# Patient Record
Sex: MALE | Race: WHITE | NOT HISPANIC OR LATINO | URBAN - METROPOLITAN AREA
[De-identification: names, ages, dates, MRNs, and addresses within clinical notes are randomized per-mention and may not be internally consistent; named-entity substitution may affect disease eponyms.]

---

## 2017-03-05 ENCOUNTER — EMERGENCY (EMERGENCY)
Facility: HOSPITAL | Age: 57
LOS: 1 days | Discharge: HOME | End: 2017-03-05
Attending: EMERGENCY MEDICINE | Admitting: EMERGENCY MEDICINE
Payer: OTHER MISCELLANEOUS

## 2017-03-05 VITALS
OXYGEN SATURATION: 99 % | TEMPERATURE: 99 F | HEART RATE: 94 BPM | SYSTOLIC BLOOD PRESSURE: 154 MMHG | WEIGHT: 229.94 LBS | RESPIRATION RATE: 16 BRPM | DIASTOLIC BLOOD PRESSURE: 86 MMHG

## 2017-03-05 DIAGNOSIS — J34.89 OTHER SPECIFIED DISORDERS OF NOSE AND NASAL SINUSES: ICD-10-CM

## 2017-03-05 DIAGNOSIS — S02.2XXA FRACTURE OF NASAL BONES, INITIAL ENCOUNTER FOR CLOSED FRACTURE: ICD-10-CM

## 2017-03-05 PROCEDURE — 99284 EMERGENCY DEPT VISIT MOD MDM: CPT

## 2017-03-05 PROCEDURE — 70160 X-RAY EXAM OF NASAL BONES: CPT | Mod: 26

## 2017-03-05 RX ORDER — TETANUS TOXOID, REDUCED DIPHTHERIA TOXOID AND ACELLULAR PERTUSSIS VACCINE, ADSORBED 5; 2.5; 8; 8; 2.5 [IU]/.5ML; [IU]/.5ML; UG/.5ML; UG/.5ML; UG/.5ML
0.5 SUSPENSION INTRAMUSCULAR ONCE
Qty: 0 | Refills: 0 | Status: COMPLETED | OUTPATIENT
Start: 2017-03-05 | End: 2017-03-05

## 2017-03-05 RX ADMIN — TETANUS TOXOID, REDUCED DIPHTHERIA TOXOID AND ACELLULAR PERTUSSIS VACCINE, ADSORBED 0.5 MILLILITER(S): 5; 2.5; 8; 8; 2.5 SUSPENSION INTRAMUSCULAR at 16:09

## 2017-03-05 NOTE — ED PROVIDER NOTE - OBJECTIVE STATEMENT
57 yo male s/p trip and fall while walking striking his face against his luggage he was rolling. no loc no head or neck pain. _ nose pain. w bleeding that has stopped.

## 2017-03-05 NOTE — ED PROVIDER NOTE - MEDICAL DECISION MAKING DETAILS
uncomplicated ed course.   At the time of discharge from the Emergency Department, the patient is alert with fluent appropriate speech and ambulatory without difficulty. A complete medical screening examination was performed and no emergency medical condition was identified.

## 2017-03-05 NOTE — ED ADULT NURSE NOTE - CHIEF COMPLAINT QUOTE
BIBA from [facility] s/p mechanical fall, pt hit face onto his rolling suitcase, with tenderness and swelling to nasal bridge, + dried blood to b/l nares. Pt also c/o HA & right wrist pain. Denies LOC no blood thinners.

## 2017-03-05 NOTE — ED PROVIDER NOTE - ENMT, MLM
Airway patent, Nasal mucosa clear. Mouth with normal mucosa. Throat has no vesicles, no oropharyngeal exudates and uvula is midline.  no septal hematoma no dental injury no blood in his oropharynx

## 2017-03-05 NOTE — ED PROVIDER NOTE - MUSCULOSKELETAL, MLM
Spine appears normal, range of motion is not limited, no muscle or joint tenderness. abrasion to right arm

## 2017-03-05 NOTE — ED ADULT TRIAGE NOTE - CHIEF COMPLAINT QUOTE
BIBA from [facility] s/p mechanical fall, pt hit face onto his rolling suitcase, with tenderness and swelling to nasal bridge, + dried blood to b/l nares. Pt also c/o HA & right wrist pain. Denies LOC no blood thinners.

## 2017-08-03 RX ORDER — OMEPRAZOLE 10 MG/1
0 CAPSULE, DELAYED RELEASE ORAL
Qty: 0 | Refills: 0 | COMMUNITY

## 2018-10-26 PROBLEM — K21.9 GASTRO-ESOPHAGEAL REFLUX DISEASE WITHOUT ESOPHAGITIS: Chronic | Status: ACTIVE | Noted: 2017-03-05

## 2018-10-26 PROBLEM — Z00.00 ENCOUNTER FOR PREVENTIVE HEALTH EXAMINATION: Status: ACTIVE | Noted: 2018-10-26

## 2018-10-29 ENCOUNTER — APPOINTMENT (OUTPATIENT)
Dept: INTERNAL MEDICINE | Facility: CLINIC | Age: 58
End: 2018-10-29

## 2018-10-29 VITALS
WEIGHT: 220 LBS | SYSTOLIC BLOOD PRESSURE: 151 MMHG | HEIGHT: 72 IN | HEART RATE: 82 BPM | DIASTOLIC BLOOD PRESSURE: 101 MMHG | TEMPERATURE: 98.5 F | OXYGEN SATURATION: 96 % | BODY MASS INDEX: 29.8 KG/M2

## 2018-10-29 DIAGNOSIS — Z80.1 FAMILY HISTORY OF MALIGNANT NEOPLASM OF TRACHEA, BRONCHUS AND LUNG: ICD-10-CM

## 2018-10-29 DIAGNOSIS — D86.9 SARCOIDOSIS, UNSPECIFIED: ICD-10-CM

## 2018-10-29 DIAGNOSIS — I10 ESSENTIAL (PRIMARY) HYPERTENSION: ICD-10-CM

## 2018-10-29 RX ORDER — CYCLOSPORINE 0.5 MG/ML
0.05 EMULSION OPHTHALMIC
Qty: 6 | Refills: 0 | Status: ACTIVE | COMMUNITY
Start: 2018-09-10

## 2018-10-29 RX ORDER — OMEPRAZOLE 40 MG/1
40 CAPSULE, DELAYED RELEASE ORAL
Qty: 30 | Refills: 0 | Status: ACTIVE | COMMUNITY
Start: 2018-06-25

## 2018-10-29 RX ORDER — TRAZODONE HYDROCHLORIDE 150 MG/1
150 TABLET ORAL
Qty: 30 | Refills: 0 | Status: ACTIVE | COMMUNITY
Start: 2018-06-03

## 2018-10-29 RX ORDER — RISEDRONATE SODIUM 35 MG/1
35 TABLET, FILM COATED ORAL
Qty: 12 | Refills: 0 | Status: ACTIVE | COMMUNITY
Start: 2018-05-13

## 2018-10-29 RX ORDER — HYDROCORTISONE VALERATE 2 MG/G
0.2 CREAM TOPICAL
Qty: 45 | Refills: 0 | Status: ACTIVE | COMMUNITY
Start: 2018-07-16

## 2018-10-29 NOTE — HISTORY OF PRESENT ILLNESS
[FreeTextEntry1] : Followup possible and abnormal chest x-ray. [de-identified] : Patient is a 57-year-old male with a history of carotid artery aneurysm status post stent about a year ago. At that time he was found to have an abnormal EKG and an abnormal PET scan. He was felt to have cardiac sarcoid and was started on high-dose steroids for 3 months. He then was placed on CellCept. He is followed by a physician at [facility]. Patient recently had a preop chest x-ray in preparation for a carotid angiogram. This is reported as showing a possible nodule in the right mid lung field and I am asked to evaluate. He denies shortness of breath cough or wheezing. Patient's prior chest x-ray in April was entirely normal.

## 2018-10-29 NOTE — ASSESSMENT
[FreeTextEntry1] : I have reviewed the chest x-ray dated 1010 2018. I see no evidence of obvious nodule. However in view of the chest chest x-ray report with a questionable right upper lobe nodule will recommend a CT of his chest to confirm that this is not a new nodule. The chest x-ray from April was also reviewed is entirely normal.

## 2019-09-14 DIAGNOSIS — G43.909 MIGRAINE, UNSPECIFIED, NOT INTRACTABLE, W/OUT STATUS MIGRAINOSUS: ICD-10-CM

## 2019-09-14 DIAGNOSIS — I77.9 DISORDER OF ARTERIES AND ARTERIOLES, UNSPECIFIED: ICD-10-CM

## 2019-09-14 DIAGNOSIS — Z86.59 PERSONAL HISTORY OF OTHER MENTAL AND BEHAVIORAL DISORDERS: ICD-10-CM

## 2019-09-14 DIAGNOSIS — Z87.442 PERSONAL HISTORY OF URINARY CALCULI: ICD-10-CM

## 2019-10-01 ENCOUNTER — APPOINTMENT (OUTPATIENT)
Dept: GASTROENTEROLOGY | Facility: CLINIC | Age: 59
End: 2019-10-01
Payer: COMMERCIAL

## 2019-10-01 VITALS
BODY MASS INDEX: 26.41 KG/M2 | HEART RATE: 80 BPM | DIASTOLIC BLOOD PRESSURE: 86 MMHG | WEIGHT: 195 LBS | HEIGHT: 72 IN | SYSTOLIC BLOOD PRESSURE: 120 MMHG

## 2019-10-01 DIAGNOSIS — M51.9 UNSPECIFIED THORACIC, THORACOLUMBAR AND LUMBOSACRAL INTERVERTEBRAL DISC DISORDER: ICD-10-CM

## 2019-10-01 DIAGNOSIS — L80 VITILIGO: ICD-10-CM

## 2019-10-01 PROCEDURE — 99244 OFF/OP CNSLTJ NEW/EST MOD 40: CPT

## 2019-10-01 RX ORDER — ATORVASTATIN CALCIUM 40 MG/1
40 TABLET, FILM COATED ORAL
Qty: 90 | Refills: 0 | Status: ACTIVE | COMMUNITY
Start: 2019-06-04

## 2019-10-01 RX ORDER — MYCOPHENOLATE MOFETIL 250 MG/1
250 CAPSULE ORAL
Qty: 180 | Refills: 0 | Status: ACTIVE | COMMUNITY
Start: 2019-09-17

## 2019-10-01 RX ORDER — ASPIRIN 325 MG/1
325 TABLET ORAL
Qty: 30 | Refills: 0 | Status: ACTIVE | COMMUNITY
Start: 2018-11-07

## 2019-10-01 NOTE — ASSESSMENT
[FreeTextEntry1] : \par \par 1. GERD:  well  -  controlled,  no ht burn,  dysphagia,  throat clear\par * No LPR,  No Parada’s w No Dysplasia,  Esophagitis grade: A-\par * Anti-reflux diet & life-style changes emphasized.  No  Bedge\par * Weight reduction & regular exercise emphasized\par * ++ PPI: Omep 40mg qd  ,  No  H2B q HS:  ,  No Carafate  1 gram:\par * F/U  EGD:  [     1 ] mo.  /  [   2020   ] yr---Barretts screen \par * No  pH Monitor,  No  Manometry,  No  Esophagram\par * No  ENT  eval/F/U,  No  Surgical  eval\par \par \par \par \par \par \par 2. Gastritis: well – controlled,  No N, V, early satiety, pain\par * No H.Pylori, No IM, No Bile, No NSAIDs\par * Avoid NSAIDs / ETOH\par * No Prevpac, No Pylera, \par * No PPI:  x 0 days,  No Carafate 1 gm QID\par * No F/U EGD  or  No UBT \par \par \par \par \par \par \par 3. Hemorrhoids:  well – controlled.  No pain,  swelling,  itch,  bleeding\par * Discussed the potential complications of thrombosis,  pain,  infection,  swelling, itching,  bleeding \par * High – Fiber Diet was reviewed and emphasized\par * 6  --  8 cups of decaffeinated fluid daily\par * Sitz Bathes BID,  No:  Anusol HC  Suppos / Cream  [place] BID\par * No:  Tucks BID,   No:  Balneol Lotion,   No:  Calmoseptine Oint,   ++ Prep H prn\par * No:  Colorectal surgical evaluation for possible ablation \par \par \par \par \par \par \par 4. Colorectal  & Gastric Neoplasia Screening:  to be evaluated\par * Discussed the pre-malignant potential of polyps\par * Discussed the importance of f/u surveillance / screening\par * High-Fiber Diet was reviewed and emphasized\par * Anti-oxidants and ASA/NSAID Therapy reviewed\par * Given age > 45-51 yo &+PH Polyps \par * Recommend Colonoscopy--1/2020,  EGD--1/2020\par * R/O  Colonic Neoplasia\par \par \par \par \par \par \par Informed Consent:\par * The risks & Benefits of EGD  /  Colonoscopy were discussed w patient.\par * This included but was not limited to perforation, bleeding, sedation /med rxns possibly requiring surgery, blood transfusions, antibiotics & CPR/Intubation.\par * Pt. understands & agrees to the procedures.\par * Pt. advised to D/C  ASA/NSAIDs  7  Days  \par * [ +++ ]  Dulcolax / Miralax / Mag. Citrate ,  [     ] Prepopik/ Clenpiq ,  [     ] Osmo Prep,  [    ] GoLytely,  prep. reviewed w Pt.\par * Hold  [           ] AM of procedure.\par * Hold  [           ] PM of procedure.\par * Take  [           ] PM of procedure.\par * Take  [           ] AM of procedure.\par \par

## 2019-10-01 NOTE — HISTORY OF PRESENT ILLNESS
[de-identified] : \par PCP: Mary Jane\par \par 57yo M w h/o Cardiac sarcoid followed by cardiology at [facility]--on cellcept, dx after preop EKG showed a RBBB/LAFB--no need for PPM, HTN, HLD, Carotid dis--s/p stent x 5 for aneurysm , Migraine H/A, Anxiety, Lumbar disc dis , Vitiligo \par Hemorrhoids\par GERD, Gastric Polyps, Gastritis\par \par Today:  feeling well, no sims, cp, sob \par \par * Abd pain—no\par * Nausea—no\par * Vomit—no\par * Belching—no\par * Regurgitation—no\par * Ht burn—occas \par * Throat Clearing—no\par * Globus—no\par * Cough—no\par * Hoarseness—no\par * Dysphagia—no\par * BMs: # 4 qd\par * Constipation—no\par * Diarrhea—no\par * Bloating—no\par * Flatulence—no\par * Gurgling—no\par * Melena—no\par * BPBPR—no\par * Anorexia—no\par * Wt. Loss-no\par \par

## 2020-01-05 ENCOUNTER — RESULT REVIEW (OUTPATIENT)
Age: 60
End: 2020-01-05

## 2020-01-06 ENCOUNTER — APPOINTMENT (OUTPATIENT)
Dept: GASTROENTEROLOGY | Facility: HOSPITAL | Age: 60
End: 2020-01-06

## 2020-01-16 ENCOUNTER — APPOINTMENT (OUTPATIENT)
Dept: GASTROENTEROLOGY | Facility: CLINIC | Age: 60
End: 2020-01-16
Payer: COMMERCIAL

## 2020-01-16 VITALS
WEIGHT: 195 LBS | DIASTOLIC BLOOD PRESSURE: 80 MMHG | BODY MASS INDEX: 26.41 KG/M2 | SYSTOLIC BLOOD PRESSURE: 126 MMHG | HEART RATE: 80 BPM | HEIGHT: 72 IN

## 2020-01-16 PROCEDURE — 99214 OFFICE O/P EST MOD 30 MIN: CPT

## 2020-01-16 NOTE — ASSESSMENT
[FreeTextEntry1] : \par \par 1. GERD:  well  -  controlled,  occas ht burn,  dysphagia,  throat clear\par * No LPR,  No Parada’s w No Dysplasia,  Esophagitis grade: A--\par * Anti-reflux diet & life-style changes emphasized.  No  Bedge\par * Weight reduction & regular exercise emphasized\par * ++ PPI: Omep 40mg qd --> 20mg qd  ,  No  H2B q HS:  ,  No Carafate  1 gram:\par * F/U  EGD:  [     1 ] mo.  /  [   2023   ] yr---Barretts screen \par * No  pH Monitor,  No  Manometry,  No  Esophagram\par * No  ENT  eval/F/U,  No  Surgical  eval\par \par \par \par \par \par \par 2. Gastritis: well – controlled,  No N, V, early satiety, pain\par * No H.Pylori, No IM, No Bile, No NSAIDs\par * Avoid NSAIDs / ETOH\par * No Prevpac, No Pylera, \par * No PPI:  x 0 days,  No Carafate 1 gm QID\par * No F/U EGD  or  No UBT \par \par \par \par \par \par \par 3. Hemorrhoids:  well – controlled.  No pain,  swelling,  itch,  bleeding\par * Discussed the potential complications of thrombosis,  pain,  infection,  swelling, itching,  bleeding \par * High – Fiber Diet was reviewed and emphasized\par * 6  --  8 cups of decaffeinated fluid daily\par * Sitz Bathes BID,  No:  Anusol HC  Suppos / Cream  [place] BID\par * No:  Tucks BID,   No:  Balneol Lotion,   No:  Calmoseptine Oint,   ++ Prep H prn\par * No:  Colorectal surgical evaluation for possible ablation \par \par \par \par \par \par \par 4. Colorectal  & Gastric Neoplasia Screening:  stable\par * Discussed the pre-malignant potential of polyps\par * Discussed the importance of f/u surveillance / screening\par * High-Fiber Diet was reviewed and emphasized\par * Anti-oxidants and ASA/NSAID Therapy reviewed\par * Given age > 45-49 yo &+PH Polyps \par * Recommend Colonoscopy--1/2025,  EGD--1/2023\par * R/O  Colonic Neoplasia\par \par \par \par \par \par 5. Diverticulosis:  well – controlled.  No pain,  infection,  bleeding\par * Discussed the potential complications of perforation,  pain,  infection,  bleeding \par * High – Fiber Diet was reviewed and emphasized\par * 6  --  8 cups of decaffeinated fluid daily\par \par \par \par \par

## 2020-01-16 NOTE — HISTORY OF PRESENT ILLNESS
[de-identified] : \par PCP: Mary Jane\par \par 60 yo M w h/o Cardiac sarcoid followed by cardiology at [facility]--on cellcept, dx after preop EKG showed a RBBB/LAFB--no need for PPM, HTN, HLD, Carotid dis--s/p stent x 5 for aneurysm , Migraine H/A, Anxiety, Lumbar disc dis , Vitiligo \par Hemorrhoids\par GERD, Gastric Polyps, Gastritis\par  \par 10/1/19 Init CC:occas ht burn \par 1/6/20: EGD: mild gastritis, KRYSTLE, No HP; 39cm, Esophagitis A--, No Barretts; 0.75cm polyp: fundic\par Colonoscopy: mild sigmoid diverticulosis, 2nd deg int hemorrhoids\par Today:  feeling well, no sims, cp, sob \par \par * Abd pain—no\par * Nausea—no\par * Vomit—no\par * Belching—no\par * Regurgitation—no\par * Ht burn—occas \par * Throat Clearing—no\par * Globus—no\par * Cough—no\par * Hoarseness—no\par * Dysphagia—no\par * BMs: # 4 qd\par * Constipation—no\par * Diarrhea—no\par * Bloating—no\par * Flatulence—no\par * Gurgling—no\par * Melena—no\par * BPBPR—no\par * Anorexia—no\par * Wt. Loss-no\par \par

## 2020-10-27 ENCOUNTER — RX RENEWAL (OUTPATIENT)
Age: 60
End: 2020-10-27

## 2021-11-15 ENCOUNTER — RX RENEWAL (OUTPATIENT)
Age: 61
End: 2021-11-15

## 2021-11-15 RX ORDER — OMEPRAZOLE 20 MG/1
20 CAPSULE, DELAYED RELEASE ORAL
Qty: 90 | Refills: 3 | Status: ACTIVE | COMMUNITY
Start: 2020-01-16 | End: 1900-01-01

## 2022-05-03 ENCOUNTER — RX RENEWAL (OUTPATIENT)
Age: 62
End: 2022-05-03

## 2022-05-03 RX ORDER — PANTOPRAZOLE 40 MG/1
40 TABLET, DELAYED RELEASE ORAL DAILY
Qty: 30 | Refills: 5 | Status: ACTIVE | COMMUNITY
Start: 2021-12-08 | End: 1900-01-01

## 2022-12-07 ENCOUNTER — NON-APPOINTMENT (OUTPATIENT)
Age: 62
End: 2022-12-07

## 2022-12-15 ENCOUNTER — APPOINTMENT (OUTPATIENT)
Dept: GASTROENTEROLOGY | Facility: CLINIC | Age: 62
End: 2022-12-15

## 2022-12-15 VITALS
BODY MASS INDEX: 28.31 KG/M2 | HEART RATE: 78 BPM | HEIGHT: 72 IN | WEIGHT: 209 LBS | DIASTOLIC BLOOD PRESSURE: 90 MMHG | SYSTOLIC BLOOD PRESSURE: 144 MMHG

## 2022-12-15 DIAGNOSIS — G47.33 OBSTRUCTIVE SLEEP APNEA (ADULT) (PEDIATRIC): ICD-10-CM

## 2022-12-15 PROCEDURE — 99215 OFFICE O/P EST HI 40 MIN: CPT

## 2022-12-15 RX ORDER — VALACYCLOVIR HYDROCHLORIDE 1 G/1
1 TABLET, FILM COATED ORAL
Refills: 0 | Status: ACTIVE | COMMUNITY

## 2022-12-15 RX ORDER — VALSARTAN 160 MG/1
160 TABLET, COATED ORAL
Refills: 0 | Status: ACTIVE | COMMUNITY

## 2022-12-15 RX ORDER — ADALIMUMAB 40MG/0.4ML
40 KIT SUBCUTANEOUS
Refills: 0 | Status: ACTIVE | COMMUNITY

## 2022-12-15 NOTE — HISTORY OF PRESENT ILLNESS
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, HTN, HLD, Carotid dis--s/p stent x 5 for aneurysm , \par RADHA --having a mouth device made\par Migraine H/A, Anxiety, Lumbar disc dis , Vitiligo \par Hemorrhoids\par GERD, Gastric Polyps, Gastritis\par  \par 10/1/19 Init CC:occas ht burn \par 1/6/20: EGD: mild gastritis, KRYSTLE, No HP; 39cm, Esophagitis A--, No Barretts; 0.75cm polyp: fundic\par Colonoscopy: mild sigmoid diverticulosis, 2nd deg int hemorrhoids\par \par 1/16/20 : occas ht burn,  \par \par 12/15/22    Today:  Feeling well, no c/o , CP, SOB/ NGUYEN, Cough, Wheeze, Palpitations, edema\par reviewed endoscopic findings and pathology in detail with patient:  EGD  /  Colonoscopy: 1/2020\par all of the patients questions were addressed and answered\par \par 12/15/22   Today: no  ht burn , dysphagia , abd pain, n, early satiety or wt loss \par \par * Abd pain-->no\par * Nausea--> no\par * Vomit--> no\par * Early satiety--> no\par * Belching--> no\par * Hiccups--> no\par * Regurgitation--> no\par * Acid Taste / Water Brash--> no\par * Ht burn--> no\par * Dysphagia--> no\par * Throat Clearing--> no\par * Hoarseness--> no\par * Post-Nasal Drip--> no\par * Congestion--> no\par * Globus--> no\par * Cough--> no\par * Wheeze / PC-> -no\par * BMs: # 4 qd\par * Constipation--> no\par * Diarrhea--> no\par * Bloating--> no\par * Strain on Defecation--> no\par * Incompl Evac--> no\par * Flatulence--> no\par * Gurgling--> no\par * Melena--> no\par * BPBPR-> -no\par * Anorexia--> no\par * Wt. Loss--> no\par \par

## 2022-12-15 NOTE — REVIEW OF SYSTEMS
[Skin Lesions] : no skin lesions [Confused] : no confusion [Easy Bruising] : no tendency for easy bruising

## 2022-12-15 NOTE — ASSESSMENT
[FreeTextEntry1] : \par \par 1. GERD:  well  -  controlled,  no ht burn,  dysphagia,  throat clear\par * No LPR,  Parada’s  ,  but +  Esophagitis grade: A--  was found \par \par Recommend: \par * Anti-reflux diet & life-style changes reviewed & re-emphasized.  \par * HOB elevation /  Bedge use emphasized\par * Weight reduction & regular exercise emphasized\par \par * ++ PPI use :  Pantop 40mg qd       --  as needed was emphasized\par No need for  H2B q HS:  \par No need for Carafate  1 gram \par I previously reviewed & summarized the prospective randomized & retrospective non-randomized studies\par looking at potential long term SE's of PPIs, w special attention to associations & actual cause\par as related to GI infections, bone loss, cognitive changes, KD, Covid, vitamin & electrolyte deficiencies\par questions were answered, pt advised that PPIs should be used when needed as indicated by their\par clinical indication and response and tapering off is always the goal if possible. pt understood.\par \par * No  need for pH Monitor,  Manometry, or  Esophagram \par * No  need for ENT  eval/F/U, \par * No  need for  Surgical  eval  \par \par * ++   F/U  EGD: --for Barretts screening / surveillance needed \par \par \par \par \par \par 2. Gastritis   :  well - controlled,  No N, V, early satiety, pain\par * No H.Pylori,   IM,    Bile;    No NSAID  or  ETOH exposure-- was found\par Recommended and reviewed: \par * Avoid NSAIDs / ETOH--have been shown to exacerbate and possible lead to cx's & GIBs\par * No Prevpac  or Pylera, \par * No PPI  or  Carafate 1 gm QID \par * No F/U EGD  or  UBT  \par \par \par \par \par \par 3. Hemorrhoids:  well - controlled.  No pain,  swelling,  itch,  bleeding\par * Discussed previously the  potential complications of thrombosis,  pain,  infection,  swelling, itching,  bleeding --none recently\par Recommendations: \par * Moderate- High  Fiber Diet was reviewed and emphasized\par * 6  --  8 cups of decaffeinated fluid daily was emphasized \par * Sitz Bathes as needed ,  No:  Anusol HC  Suppos / Cream  [place] BID -- was needed\par * No:  Tucks BID,  Balneol Lotion,   Calmoseptine Oint -- was needed ;    can use  Prep H prn\par * No:  need for  Colorectal surgical evaluation for possible ablation \par \par \par \par \par \par \par \par 4. Colorectal  & Gastric Neoplasia Screening:  stable\par * Discussed the pre-malignant potential of polyps\par * Discussed the importance of f/u surveillance / screening\par * High-Fiber Diet was reviewed and emphasized\par * Anti-oxidants and ASA/NSAID Therapy reviewed\par * Given age > 45-51 yo &+PH Polyps \par * Recommend Colonoscopy--1/2025,  EGD--1/2023 to  R/O  Colonic / Gastric Neoplasia\par \par \par \par \par 5. Diverticulosis:  well - controlled.  No pain,  infection,  bleeding\par Recommendations\par * Discussed previously and  reviewed the potential complications of perforation,  pain,  infection,  bleeding \par * Moderate-High  Fiber Diet was reviewed & emphasized\par * Daily fluid intake was reviewed : 6  --  8 cups of decaffeinated fluid daily\par \par \par \par \par \par Informed Consent:\par * The risks & Benefits of EGD  were discussed w patient.\par * This included but was not limited to perforation, bleeding, sedation /med rxns possibly requiring surgery, blood transfusions, antibiotics & CPR/Intubation.\par * Pt. understands & agrees to the procedures.\par The following instructions in regards to the prep and medically essential ( cardiac, pulmonary, sz, psych, endocrine)  pre-op medication administration\par was reviewed and emphasized with the patient . \par * Pt. advised to D/C  ASA/NSAIDs  7  Days   PTP.\par * [ +++ ]  Dulcolax / Miralax / Mag. Citrate ,  [     ] Prepopik/ Clenpiq ,  [     ] Osmo Prep,  [    ] GoLytely,  prep. reviewed w Pt.\par * Hold  [           ] AM of procedure.\par * Hold  [           ] PM  before procedure.\par * Take  [           ] PM  before procedure.\par * Take  [  valsartan          ] AM of procedure.\par \par \par \par \par \par \par

## 2023-01-30 ENCOUNTER — RESULT REVIEW (OUTPATIENT)
Age: 63
End: 2023-01-30

## 2023-01-31 ENCOUNTER — RESULT REVIEW (OUTPATIENT)
Age: 63
End: 2023-01-31

## 2023-02-01 ENCOUNTER — APPOINTMENT (OUTPATIENT)
Dept: GASTROENTEROLOGY | Facility: HOSPITAL | Age: 63
End: 2023-02-01

## 2023-02-10 ENCOUNTER — NON-APPOINTMENT (OUTPATIENT)
Age: 63
End: 2023-02-10

## 2023-02-11 ENCOUNTER — NON-APPOINTMENT (OUTPATIENT)
Age: 63
End: 2023-02-11

## 2023-03-09 ENCOUNTER — APPOINTMENT (OUTPATIENT)
Dept: GASTROENTEROLOGY | Facility: CLINIC | Age: 63
End: 2023-03-09
Payer: COMMERCIAL

## 2023-03-09 VITALS
BODY MASS INDEX: 28.04 KG/M2 | WEIGHT: 207 LBS | DIASTOLIC BLOOD PRESSURE: 78 MMHG | HEIGHT: 72 IN | HEART RATE: 69 BPM | SYSTOLIC BLOOD PRESSURE: 104 MMHG

## 2023-03-09 DIAGNOSIS — K29.30 CHRONIC SUPERFICIAL GASTRITIS W/OUT BLEEDING: ICD-10-CM

## 2023-03-09 DIAGNOSIS — K31.7 POLYP OF STOMACH AND DUODENUM: ICD-10-CM

## 2023-03-09 DIAGNOSIS — K64.8 OTHER HEMORRHOIDS: ICD-10-CM

## 2023-03-09 DIAGNOSIS — K21.00 GASTRO-ESOPHAGEAL REFLUX DISEASE WITH ESOPHAGITIS, WITHOUT BLEEDING: ICD-10-CM

## 2023-03-09 DIAGNOSIS — Z12.11 ENCOUNTER FOR SCREENING FOR MALIGNANT NEOPLASM OF COLON: ICD-10-CM

## 2023-03-09 DIAGNOSIS — K57.90 DIVERTICULOSIS OF INTESTINE, PART UNSPECIFIED, W/OUT PERFORATION OR ABSCESS W/OUT BLEEDING: ICD-10-CM

## 2023-03-09 PROCEDURE — 99215 OFFICE O/P EST HI 40 MIN: CPT

## 2023-03-09 NOTE — HISTORY OF PRESENT ILLNESS
[de-identified] : \par \par This HPI reflects a summary and review of records : including previous and most recent  Labs, body imaging, consults and progress notes, operative and pathology reports, EKG reports, ED records, found in [employer], [employer],  [employer] and any additional records brought in by  the patient at the time of the visit.\par \par \par PCP: Mary Jane--> Rojas \par \par 61 yo M w h/o Cardiac sarcoid followed by cardiology at [facility]--on cellcept, dx after preop EKG showed a RBBB/LAFB--no need for PPM, HTN, HLD, Carotid dis--s/p stent x 5 for aneurysm , \par RADHA --having a mouth device made\par Migraine H/A, Anxiety, Lumbar disc dis , Vitiligo \par Hemorrhoids\par GERD, Gastric Polyps, Gastritis\par  \par 10/1/19 Init CC:occas ht burn \par 1/6/20: EGD: mild gastritis, KRYSTLE, No HP; 39cm, Esophagitis A--, No Barretts; 0.75cm polyp: fundic\par Colonoscopy: mild sigmoid diverticulosis, 2nd deg int hemorrhoids\par \par 1/16/20 : occas ht burn,  \par \par 12/15/22   no  ht burn , dysphagia , abd pain, n, early satiety or wt loss \par 2/1/23 EGD:  Gastritis,mild CI,  No HP;  0.75cm gastric polyp:Fundic; \par                      1cm HH, Esophagitis A--, No Barretts; \par \par \par 3/9/23  Today:  Feeling well, no c/o , CP, SOB/ NGUYEN, Cough, Wheeze, Palpitations, edema\par reviewed endoscopic findings and pathology in detail with patient:  EGD  /  Colonoscopy: 1/2020\par all of the patients questions were addressed and answered\par \par 3/9/23  Today: on Pantop 40mg bec of prior plavix, used to take Omep 40mg--> feeld it worked \par                          better, occas throat clear and belch, no dysphagia\par \par * Abd pain-->no\par * Nausea--> no\par * Vomit--> no\par * Early satiety--> no\par * Belching--> yes\par * Hiccups--> no\par * Regurgitation--> no\par * Acid Taste / Water Brash--> no\par * Ht burn--> no\par * Dysphagia--> no\par * Throat Clearing--> occas \par * Hoarseness--> no\par * Post-Nasal Drip--> no\par * Congestion--> no\par * Globus--> no\par * Cough--> no\par * Wheeze / PC-> -no\par * BMs: # 4 qd\par * Constipation--> no\par * Diarrhea--> no\par * Bloating--> no\par * Strain on Defecation--> no\par * Incompl Evac--> no\par * Flatulence--> no\par * Gurgling--> no\par * Melena--> no\par * BPBPR-> -no\par * Anorexia--> no\par * Wt. Loss--> no\par \par

## 2023-03-09 NOTE — ASSESSMENT
[FreeTextEntry1] : \par \par 1. GERD:  occas throat clear/ belch,  no ht burn,  dysphagia,  \par * No LPR,  Parada’s  ,  but +  Esophagitis grade: A--  was found \par \par Recommend: \par * Anti-reflux diet & life-style changes reviewed & re-emphasized.  \par * HOB elevation /  Bedge use emphasized\par * Weight reduction & regular exercise emphasized\par \par * ++ PPI use :  Pantop 40mg qd --> switch to Omep 40mg qd      --  as needed was emphasized\par No need for  H2B q HS:  \par No need for Carafate  1 gram \par I previously reviewed & summarized the prospective randomized & retrospective non-randomized studies\par looking at potential long term SE's of PPIs, w special attention to associations & actual cause\par as related to GI infections, bone loss, cognitive changes, KD, Covid, vitamin & electrolyte deficiencies\par questions were answered, pt advised that PPIs should be used when needed as indicated by their\par clinical indication and response and tapering off is always the goal if possible. pt understood.\par \par * No  need for pH Monitor,  Manometry, or  Esophagram \par * No  need for ENT  eval/F/U, \par * No  need for  Surgical  eval  \par \par * ++   F/U  EGD: --for Barretts screening / surveillance needed --> 2/2026\par \par \par \par \par \par 2. Gastritis   :  well - controlled,  No N, V, early satiety, pain\par * No H.Pylori,   IM,    Bile;    No NSAID  or  ETOH exposure-- was found\par Recommended and reviewed: \par * Avoid NSAIDs / ETOH--have been shown to exacerbate and possible lead to cx's & GIBs\par * No Prevpac  or Pylera, \par * No PPI  or  Carafate 1 gm QID \par * No F/U EGD  or  UBT  \par \par \par \par \par \par 3. Hemorrhoids:  well - controlled.  No pain,  swelling,  itch,  bleeding\par * Discussed previously the  potential complications of thrombosis,  pain,  infection,  swelling, itching,  bleeding --none recently\par Recommendations: \par * Moderate- High  Fiber Diet was reviewed and emphasized\par * 6  --  8 cups of decaffeinated fluid daily was emphasized \par * Sitz Bathes as needed ,  No:  Anusol HC  Suppos / Cream  [place] BID -- was needed\par * No:  Tucks BID,  Balneol Lotion,   Calmoseptine Oint -- was needed ;    can use  Prep H prn\par * No:  need for  Colorectal surgical evaluation for possible ablation \par \par \par \par \par \par \par \par 4. Colorectal  & Gastric Neoplasia Screening:  stable\par * Discussed the pre-malignant potential of polyps\par * Discussed the importance of f/u surveillance / screening\par * High-Fiber Diet was reviewed and emphasized\par * Anti-oxidants and ASA/NSAID Therapy reviewed\par * Given age > 45-51 yo &+PH Polyps \par * Recommend Colonoscopy--1/2027,  EGD--2/2025 to  R/O  Colonic / Gastric Neoplasia\par \par \par \par \par \par \par 5. Diverticulosis:  well - controlled.  No pain,  infection,  bleeding\par Recommendations\par * Discussed previously and  reviewed the potential complications of perforation,  pain,  infection,  bleeding \par * Moderate-High  Fiber Diet was reviewed & emphasized\par * Daily fluid intake was reviewed : 6  --  8 cups of decaffeinated fluid daily\par \par \par \par \par \par \par \par \par \par \par \par \par  0

## 2024-02-19 ENCOUNTER — RX RENEWAL (OUTPATIENT)
Age: 64
End: 2024-02-19

## 2024-02-19 RX ORDER — OMEPRAZOLE 40 MG/1
40 CAPSULE, DELAYED RELEASE ORAL
Qty: 90 | Refills: 3 | Status: ACTIVE | COMMUNITY
Start: 2023-03-09 | End: 1900-01-01

## 2025-05-22 RX ORDER — OMEPRAZOLE 40 MG/1
40 CAPSULE, DELAYED RELEASE ORAL
Qty: 90 | Refills: 3 | Status: ACTIVE | COMMUNITY
Start: 2025-05-22 | End: 1900-01-01